# Patient Record
Sex: FEMALE | URBAN - METROPOLITAN AREA
[De-identification: names, ages, dates, MRNs, and addresses within clinical notes are randomized per-mention and may not be internally consistent; named-entity substitution may affect disease eponyms.]

---

## 2023-10-11 ENCOUNTER — OFFICE VISIT (OUTPATIENT)
Dept: FAMILY MEDICINE CLINIC | Age: 33
End: 2023-10-11
Payer: COMMERCIAL

## 2023-10-11 VITALS
HEART RATE: 72 BPM | WEIGHT: 116 LBS | OXYGEN SATURATION: 98 % | BODY MASS INDEX: 19.33 KG/M2 | HEIGHT: 65 IN | RESPIRATION RATE: 16 BRPM

## 2023-10-11 DIAGNOSIS — N94.3 PMS (PREMENSTRUAL SYNDROME): ICD-10-CM

## 2023-10-11 DIAGNOSIS — E61.1 IRON DEFICIENCY: ICD-10-CM

## 2023-10-11 DIAGNOSIS — N92.6 ABNORMAL MENSTRUAL CYCLE: Primary | ICD-10-CM

## 2023-10-11 PROCEDURE — 99204 OFFICE O/P NEW MOD 45 MIN: CPT | Performed by: FAMILY MEDICINE

## 2023-10-11 RX ORDER — FERROUS SULFATE 7.5 MG/0.5
15 SYRINGE (EA) ORAL DAILY
COMMUNITY

## 2023-10-11 SDOH — ECONOMIC STABILITY: INCOME INSECURITY: HOW HARD IS IT FOR YOU TO PAY FOR THE VERY BASICS LIKE FOOD, HOUSING, MEDICAL CARE, AND HEATING?: NOT HARD AT ALL

## 2023-10-11 SDOH — ECONOMIC STABILITY: HOUSING INSECURITY
IN THE LAST 12 MONTHS, WAS THERE A TIME WHEN YOU DID NOT HAVE A STEADY PLACE TO SLEEP OR SLEPT IN A SHELTER (INCLUDING NOW)?: NO

## 2023-10-11 SDOH — ECONOMIC STABILITY: FOOD INSECURITY: WITHIN THE PAST 12 MONTHS, YOU WORRIED THAT YOUR FOOD WOULD RUN OUT BEFORE YOU GOT MONEY TO BUY MORE.: NEVER TRUE

## 2023-10-11 SDOH — ECONOMIC STABILITY: FOOD INSECURITY: WITHIN THE PAST 12 MONTHS, THE FOOD YOU BOUGHT JUST DIDN'T LAST AND YOU DIDN'T HAVE MONEY TO GET MORE.: NEVER TRUE

## 2023-10-11 ASSESSMENT — PATIENT HEALTH QUESTIONNAIRE - PHQ9
SUM OF ALL RESPONSES TO PHQ QUESTIONS 1-9: 0
SUM OF ALL RESPONSES TO PHQ QUESTIONS 1-9: 0
1. LITTLE INTEREST OR PLEASURE IN DOING THINGS: 0
SUM OF ALL RESPONSES TO PHQ QUESTIONS 1-9: 0
2. FEELING DOWN, DEPRESSED OR HOPELESS: 0
SUM OF ALL RESPONSES TO PHQ QUESTIONS 1-9: 0
SUM OF ALL RESPONSES TO PHQ9 QUESTIONS 1 & 2: 0

## 2023-10-18 LAB
CORTISOL - AM: 11.6 UG/DL (ref 6.2–19.4)
DHEAS (DHEA SULFATE): 161 UG/DL (ref 84.8–378)
ESTRADIOL, SENSITIVE: 90.6 PG/ML
FERRITIN: 48 NG/ML (ref 15–150)
FOLLICLE STIMULATING HORMONE: 13.9 MIU/ML
IRON SATURATION: 48 % (ref 15–55)
IRON: 144 UG/DL (ref 27–159)
PROLACTIN: 15.1 NG/ML (ref 4.8–23.3)
TESTOSTERONE FREE-MALE: 0.7 PG/ML (ref 0–4.2)
TESTOSTERONE TOTAL-MALE: 12 NG/DL (ref 8–60)
TOTAL IRON BINDING CAPACITY: 297 UG/DL (ref 250–450)
TSH SERPL DL<=0.05 MIU/L-ACNC: 2.84 UIU/ML (ref 0.45–4.5)
UNSATURATED IRON BINDING CAPACITY: 153 UG/DL (ref 131–425)

## 2023-10-26 ENCOUNTER — TELEPHONE (OUTPATIENT)
Dept: FAMILY MEDICINE CLINIC | Age: 33
End: 2023-10-26

## 2023-10-26 DIAGNOSIS — R79.89 LOW TESTOSTERONE LEVEL IN FEMALE: ICD-10-CM

## 2023-10-26 DIAGNOSIS — R79.89 ELEVATED PROLACTIN LEVEL: ICD-10-CM

## 2023-10-26 RX ORDER — CABERGOLINE 0.5 MG/1
0.25 TABLET ORAL WEEKLY
Qty: 6 TABLET | Refills: 0 | Status: SHIPPED | OUTPATIENT
Start: 2023-10-26

## 2023-10-29 LAB
ESTRADIOL, SENSITIVE: 153 PG/ML
PROGESTERONE LEVEL: 20.8 NG/ML

## 2024-01-09 ENCOUNTER — TELEPHONE (OUTPATIENT)
Dept: FAMILY MEDICINE CLINIC | Age: 34
End: 2024-01-09

## 2024-01-09 NOTE — TELEPHONE ENCOUNTER
----- Message from Phylicia Munson sent at 1/5/2024 11:28 AM EST -----  Subject: Message to Provider    QUESTIONS  Information for Provider? Patient is calling in to schedule appointments/   establish care with Dr. Jose Sanchez for her  and 3 children. There is   not availability. please contact patient  ---------------------------------------------------------------------------  --------------  CALL BACK INFO  [phone]; OK to leave message on makemoji,OK to respond with electronic   message via [facility] portal (only for patients who have registered [facility]   account)  ---------------------------------------------------------------------------  --------------  SCRIPT ANSWERS  Relationship to Patient? Self

## 2024-06-24 ENCOUNTER — TELEMEDICINE (OUTPATIENT)
Dept: FAMILY MEDICINE CLINIC | Age: 34
End: 2024-06-24
Payer: COMMERCIAL

## 2024-06-24 DIAGNOSIS — R79.89 ABNORMAL FSH LEVEL: ICD-10-CM

## 2024-06-24 DIAGNOSIS — N92.6 ABNORMAL BLEEDING IN MENSTRUAL CYCLE: Primary | ICD-10-CM

## 2024-06-24 PROCEDURE — 99214 OFFICE O/P EST MOD 30 MIN: CPT | Performed by: FAMILY MEDICINE

## 2024-06-24 ASSESSMENT — PATIENT HEALTH QUESTIONNAIRE - PHQ9
SUM OF ALL RESPONSES TO PHQ QUESTIONS 1-9: 0
1. LITTLE INTEREST OR PLEASURE IN DOING THINGS: NOT AT ALL
SUM OF ALL RESPONSES TO PHQ9 QUESTIONS 1 & 2: 0
SUM OF ALL RESPONSES TO PHQ QUESTIONS 1-9: 0
2. FEELING DOWN, DEPRESSED OR HOPELESS: NOT AT ALL
SUM OF ALL RESPONSES TO PHQ QUESTIONS 1-9: 0
SUM OF ALL RESPONSES TO PHQ QUESTIONS 1-9: 0

## 2024-06-24 NOTE — PROGRESS NOTES
6/24/2024    This is a 33 y.o. female   Chief Complaint   Patient presents with    Menopause     Pt is having symptoms of pre menopause      HPI     to [facility]  Here for f/u    Prior pregnancies: two. No prior miscarriages. 3 living children (fraternal twins)  She and her  have been trying to conceive for the past 4 years.      Prior workup notable for low iron and has gia back on this.     Notes significant PMS symptoms that start about 9 days prior to menses     Tracks cycles on an brunilda. Cycle length was about 25 days (this is baseline). Ovulation typically occurs around day 10-12.    However, over the past 2 months, notes having cycles almost every 2 weeks (4 episodes of bleeding total). Has noted mood changes during this time. Denies hot flashes.    Prior workup notable for elevated FSH    Review of Systems     Current Outpatient Medications   Medication Sig Dispense Refill    cabergoline (DOSTINEX) 0.5 MG tablet Take 0.5 tablets by mouth once a week 6 tablet 0    ferrous sulfate (ALISON-IN-SOL) 75 (15 Fe) MG/ML solution Take 1 mL by mouth daily       No current facility-administered medications for this visit.       There were no vitals taken for this visit.    Physical Exam    Wt Readings from Last 3 Encounters:   10/11/23 52.6 kg (116 lb)       BP Readings from Last 3 Encounters:   No data found for BP     Assessment/Plan:  1. Abnormal bleeding in menstrual cycle  - US NON OB TRANSVAGINAL; Future  - Follicle Stimulating Hormone; Future  - Estradiol; Future  - ANTI MULLERIAN HORMONE; Future    2. Abnormal FSH level    Check ultrasound to rule out endometrial hyperplasia in context of abnormal bleeding.    Discussed concern for premature ovarian failure based on her symptoms and prior elevated FSH. We will repeat FSH, estradiol, and add an AMH to further assess this.    If POF is present, discussed consideration of HRT given young age which we will revisit.    Denia Samuel, was evaluated through a

## 2024-06-24 NOTE — PATIENT INSTRUCTIONS
Desvenlafaxine 100mg   Escitalopram 10-20mg  Venlafaxine 37.5 - 75mg ER    2nd line:  Clonidine  Gabapentin    Alternative:  Black cohosh  Red clover    Hormonal:  Estrogen + Bazedoxifene (0.45 / 20), can help in osteoporosis prevention and in women who cannot tolerate progesterone

## 2024-06-25 PROBLEM — R79.89 ABNORMAL FSH LEVEL: Status: ACTIVE | Noted: 2024-06-25

## 2024-06-26 ENCOUNTER — PATIENT MESSAGE (OUTPATIENT)
Dept: FAMILY MEDICINE CLINIC | Age: 34
End: 2024-06-26

## 2024-06-26 DIAGNOSIS — E61.1 IRON DEFICIENCY: Primary | ICD-10-CM

## 2024-06-27 ENCOUNTER — HOSPITAL ENCOUNTER (OUTPATIENT)
Dept: ULTRASOUND IMAGING | Age: 34
Discharge: HOME | End: 2024-06-27
Payer: COMMERCIAL

## 2024-06-27 DIAGNOSIS — N92.6 ABNORMAL BLEEDING IN MENSTRUAL CYCLE: ICD-10-CM

## 2024-06-27 PROCEDURE — 76856 US EXAM PELVIC COMPLETE: CPT

## 2024-06-27 PROCEDURE — 76830 TRANSVAGINAL US NON-OB: CPT

## 2024-10-17 ENCOUNTER — OFFICE VISIT (OUTPATIENT)
Dept: FAMILY MEDICINE CLINIC | Age: 34
End: 2024-10-17
Payer: COMMERCIAL

## 2024-10-17 VITALS
DIASTOLIC BLOOD PRESSURE: 68 MMHG | HEIGHT: 65 IN | OXYGEN SATURATION: 97 % | WEIGHT: 119 LBS | HEART RATE: 81 BPM | BODY MASS INDEX: 19.83 KG/M2 | SYSTOLIC BLOOD PRESSURE: 112 MMHG

## 2024-10-17 DIAGNOSIS — B02.9 HERPES ZOSTER WITHOUT COMPLICATION: Primary | ICD-10-CM

## 2024-10-17 PROCEDURE — 99214 OFFICE O/P EST MOD 30 MIN: CPT | Performed by: FAMILY MEDICINE

## 2024-10-17 RX ORDER — VALACYCLOVIR HYDROCHLORIDE 1 G/1
2000 TABLET, FILM COATED ORAL 2 TIMES DAILY
Qty: 8 TABLET | Refills: 0 | Status: SHIPPED | OUTPATIENT
Start: 2024-10-17 | End: 2024-10-19

## 2024-10-17 RX ORDER — METHYLPREDNISOLONE 4 MG
TABLET, DOSE PACK ORAL
Qty: 1 KIT | Refills: 0 | Status: SHIPPED | OUTPATIENT
Start: 2024-10-17

## 2024-10-17 SDOH — ECONOMIC STABILITY: FOOD INSECURITY: WITHIN THE PAST 12 MONTHS, THE FOOD YOU BOUGHT JUST DIDN'T LAST AND YOU DIDN'T HAVE MONEY TO GET MORE.: NEVER TRUE

## 2024-10-17 SDOH — ECONOMIC STABILITY: FOOD INSECURITY: WITHIN THE PAST 12 MONTHS, YOU WORRIED THAT YOUR FOOD WOULD RUN OUT BEFORE YOU GOT MONEY TO BUY MORE.: NEVER TRUE

## 2024-10-17 SDOH — ECONOMIC STABILITY: INCOME INSECURITY: HOW HARD IS IT FOR YOU TO PAY FOR THE VERY BASICS LIKE FOOD, HOUSING, MEDICAL CARE, AND HEATING?: NOT HARD AT ALL

## 2024-10-17 NOTE — PROGRESS NOTES
RASH  Subjective:     Chief Complaint   Patient presents with    Rash     Pt has a rash on her inner R thigh, pt states her skin is very sensitive to the touch.       Denia Samuel is a 34 y.o. female who presents for evaluation of rash involving the lower leg and lumbar region. Rash started 2 days ago with some itching and skin hypersensitivity in L2 distribution. 2 clusters of red spots appeared since inner R thigh and R lumbar. Rash is painful and is pruritic. Associated symptoms: no associated symptoms. Patient denies: fever, sore throat.     Objective:   PHYSICAL EXAM  /68 (Site: Right Upper Arm, Position: Sitting, Cuff Size: Medium Adult)   Pulse 81   Ht 1.651 m (5' 5\")   Wt 54 kg (119 lb)   SpO2 97%   BMI 19.80 kg/m²   Wt Readings from Last 3 Encounters:   10/17/24 54 kg (119 lb)   10/11/23 52.6 kg (116 lb)     BP Readings from Last 3 Encounters:   10/17/24 112/68     GENERAL: well-developed, well-nourished, alert, no distress  SKIN:  Rash located on right lumbar patch of pink early vesicules. R inner thigh patch of pink papules. Lesions is/are vesicular. Coloring is pink.    Assessment/Plan:      Diagnosis Orders   1. Herpes zoster without complication  valACYclovir (VALTREX) 1 g tablet    methylPREDNISolone (MEDROL DOSEPACK) 4 MG tablet      Meds to help prevent neuropathy  Plan as below.  Take antiviral and steroids.  Return if pain not tolerable.

## 2024-10-17 NOTE — PATIENT INSTRUCTIONS
Plan as below.  Take antiviral and steroids.  Return if pain not tolerable.    Patient Education      SHINGLES     Overview   What is shingles?  Shingles is another name for a condition called \"herpes zoster.\" It is an infection that results from the reactivation of the same virus that causes chickenpox (the varicella-zoster virus). Shingles causes a painful rash.    What is herpes zoster ophthalmicus (HZO)?  Herpes zoster ophthalmicus (HZO) is caused by the same virus that causes chickenpox and shingles. When the virus involves the skin around the eye and the eye itself, it is called HZO. It is not the same virus that causes herpes simplex.    Can I give shingles or HZO to others?  No one can catch shingles or HZO from you. However, they can catch chickenpox if they haven't already had chickenpox or had the chickenpox vaccine. The varicella-zoster virus (which is the virus that causes chickenpox, shingles and HZO) lives in the blisters from shingles and HZO. The virus can be spread until the blisters are completely healed. If you have blisters that have not crusted over yet, you should stay away from anyone who has never had chickenpox, babies younger than 12 months of age, pregnant women and very sick patients (such as patients who have cancer or AIDS). If you live with children who have not had chickenpox, you should tell your doctor. They may need to be vaccinated.    What is postherpetic neuralgia?  Postherpetic neuralgia is the name used when the pain of shingles lasts for a long time after the rash is gone. About 10% of people who have shingles will develop postherpetic neuralgia. It is caused by damaged nerve fibers that send exaggerated pain messages from your skin to your brain.  Most people who develop postherpetic neuralgia get better with time. Almost all of them are free of pain within 1 year. However, a few people have chronic pain (pain that doesn't go away).    Symptoms   What are the symptoms of